# Patient Record
Sex: FEMALE | Race: WHITE
[De-identification: names, ages, dates, MRNs, and addresses within clinical notes are randomized per-mention and may not be internally consistent; named-entity substitution may affect disease eponyms.]

---

## 2019-08-29 ENCOUNTER — HOSPITAL ENCOUNTER (OUTPATIENT)
Dept: HOSPITAL 74 - FASU | Age: 59
Discharge: HOME | End: 2019-08-29
Attending: ORTHOPAEDIC SURGERY
Payer: COMMERCIAL

## 2019-08-29 VITALS — TEMPERATURE: 98.6 F

## 2019-08-29 VITALS — SYSTOLIC BLOOD PRESSURE: 117 MMHG | HEART RATE: 72 BPM | DIASTOLIC BLOOD PRESSURE: 63 MMHG

## 2019-08-29 VITALS — BODY MASS INDEX: 42.1 KG/M2

## 2019-08-29 DIAGNOSIS — M48.061: Primary | ICD-10-CM

## 2019-08-29 PROCEDURE — 01NB0ZZ RELEASE LUMBAR NERVE, OPEN APPROACH: ICD-10-PCS | Performed by: ORTHOPAEDIC SURGERY

## 2019-08-29 NOTE — OP
Operative Note





- Note:


Operative Date: 08/29/19


Pre-Operative Diagnosis: lumbar stenosis, disc herniation


Operation: laminectomy of L4-5, microdisectomy


Surgeon: Shahzad Mohan


Assistant: Kanwal Trejo


Anesthesiologist/CRNA: Leventhal,Joshua


Anesthesia: General


Estimated Blood Loss (mls): 10


Fluid Volume Replaced (mls): 1,900


Operative Report Dictated: Yes

## 2019-08-29 NOTE — OP
DATE OF OPERATION:  08/29/2019

 

PREOPERATIVE DIAGNOSIS:  Spinal stenosis L4-L5. 

 

POSTOPERATIVE DIAGNOSIS:  Spinal stenosis L4-L5. 

 

PROCEDURE PERFORMED:  Laminectomy L4-L5. 

 

SURGEON:  Shahzad Mohan MD 

 

ASSISTANT:  JOSÉ MIGUEL Ruiz 

 

ESTIMATED BLOOD LOSS:  50 mL.

 

INTRAVENOUS FLUIDS:  Per anesthesia.

 

ANESTHESIA:  Spinal/TLIP.

 

COMPLICATIONS:  None.  

 

DISPOSITION:  Patient brought to the PACU in stable condition.

 

INDICATIONS FOR SURGERY:  Patient is a 59-year-old female who has been suffering from

pain from her back down her legs.  X-rays and MRI were completed, which noted that

she had spinal stenosis at L4-L5.  She had gone through an exhaustive course of

treatment for this, which included medications, physical therapy as well as

injections.  Unfortunately, her pain continued to persist despite all this.  At this

point, risks, benefits, and alternatives were discussed, and the patient consented to

surgery.

 

DESCRIPTION OF PROCEDURE:  Patient was brought to the operating room by anesthesia

staff.  After appropriate patient identification was performed, spinal anesthesia was

given.  _____.  Patient was able to position herself prone onto the OR table with all

areas and bony prominences well padded at this time.  Two needles were placed into

the back to mary off the L4-5 level.  X-ray was taken to confirm this was correct. 

Needle was removed, and 10 mL of lidocaine with epinephrine was injected into her

back.  At this time, her back was prepped and draped in a sterile manner.  

 

At this point, time-out was completed.   An incision was made from the top of L4 down

to the bottom of L5.  Dissection was carried down to the fascia.  Fascia was split

open at this time, and appropriate retractor was then placed in.  A spinal needle was

placed onto the L4 lamina.  X-ray taken to confirm this was correct.  Needle was

removed, and the interspinous ligament at L4-5 was removed.  Subsequently, the L4, L5

spinous processes were removed.  The lamina was removed.  Flavum was removed.  A full

decompression was performed such that by the end of the procedure the L5 nerve root

appeared to be well decompressed.  

 

All bleeding was well controlled at this time.  _____ was placed over the nerve root.

 FloSeal was placed over that.  The fascia was closed with a No. 1 Vicryl suture. 

Subcutaneous tissue was closed with 2-0 Vicryl suture.  Skin was closed with 3-0

Monocryl suture.  Dermabond was applied.  Steri-Strips were applied.  Sterile

dressing was applied.  Patient was placed supine on the OR bed and brought to the

PACU in stable condition.

 

 

SHAHZAD MOHAN M.D.

 

[id number]

DD: 08/29/2019 13:05

DT: 08/29/2019 14:05

Job #:  [id number]

## 2019-08-29 NOTE — SURG
Surgery First Assist Note


First Assist: Kanwal Trejo PA-C


Date of Service: 08/29/19


Diagnosis: 





lumbar stenosis, disc herniation


Procedure: 





laminectomy of L4-5, microdisectomy


I was present for the entirety of the operative procedure. For further detail, 

please refer to operative report.








Visit type





- Case Type


Case Type: Scheduled





- Emergency


Emergency Visit: No





- New patient


This patient is new to me today: Yes


Date on this admission: 08/29/19